# Patient Record
Sex: MALE | Race: WHITE | Employment: FULL TIME | URBAN - METROPOLITAN AREA
[De-identification: names, ages, dates, MRNs, and addresses within clinical notes are randomized per-mention and may not be internally consistent; named-entity substitution may affect disease eponyms.]

---

## 2017-12-15 ENCOUNTER — OFFICE VISIT (OUTPATIENT)
Dept: FAMILY MEDICINE CLINIC | Age: 32
End: 2017-12-15
Payer: COMMERCIAL

## 2017-12-15 VITALS
HEART RATE: 75 BPM | TEMPERATURE: 98.2 F | SYSTOLIC BLOOD PRESSURE: 109 MMHG | DIASTOLIC BLOOD PRESSURE: 67 MMHG | WEIGHT: 120.4 LBS | HEIGHT: 63 IN | BODY MASS INDEX: 21.33 KG/M2

## 2017-12-15 DIAGNOSIS — F17.200 SMOKER UNMOTIVATED TO QUIT: ICD-10-CM

## 2017-12-15 DIAGNOSIS — B07.0 PLANTAR WART OF BOTH FEET: ICD-10-CM

## 2017-12-15 DIAGNOSIS — Z11.4 ENCOUNTER FOR SCREENING FOR HIV: ICD-10-CM

## 2017-12-15 DIAGNOSIS — M54.6 CHRONIC MIDLINE THORACIC BACK PAIN: Primary | ICD-10-CM

## 2017-12-15 DIAGNOSIS — G89.29 CHRONIC MIDLINE THORACIC BACK PAIN: Primary | ICD-10-CM

## 2017-12-15 PROCEDURE — G8420 CALC BMI NORM PARAMETERS: HCPCS | Performed by: STUDENT IN AN ORGANIZED HEALTH CARE EDUCATION/TRAINING PROGRAM

## 2017-12-15 PROCEDURE — G8484 FLU IMMUNIZE NO ADMIN: HCPCS | Performed by: STUDENT IN AN ORGANIZED HEALTH CARE EDUCATION/TRAINING PROGRAM

## 2017-12-15 PROCEDURE — 4004F PT TOBACCO SCREEN RCVD TLK: CPT | Performed by: STUDENT IN AN ORGANIZED HEALTH CARE EDUCATION/TRAINING PROGRAM

## 2017-12-15 PROCEDURE — G8427 DOCREV CUR MEDS BY ELIG CLIN: HCPCS | Performed by: STUDENT IN AN ORGANIZED HEALTH CARE EDUCATION/TRAINING PROGRAM

## 2017-12-15 PROCEDURE — 99203 OFFICE O/P NEW LOW 30 MIN: CPT | Performed by: STUDENT IN AN ORGANIZED HEALTH CARE EDUCATION/TRAINING PROGRAM

## 2017-12-15 RX ORDER — IBUPROFEN 600 MG/1
600 TABLET ORAL EVERY 6 HOURS PRN
Qty: 120 TABLET | Refills: 2 | Status: SHIPPED | OUTPATIENT
Start: 2017-12-15

## 2017-12-15 ASSESSMENT — ENCOUNTER SYMPTOMS
BACK PAIN: 1
ABDOMINAL PAIN: 0
SHORTNESS OF BREATH: 0
CONSTIPATION: 0
CHEST TIGHTNESS: 0

## 2017-12-15 NOTE — PROGRESS NOTES
Subjective:    Kay Mackenzie is a 33 y.o. male with  has a past medical history of ADHD (attention deficit hyperactivity disorder); Anxiety; Chronic back pain; Elbow fracture; Headache(784.0); Kidney stone; and Osteoarthritis. Family History   Problem Relation Age of Onset    Heart Disease Mother    [place] Am[place] COPD Mother     Urolithiasis Mother     COPD Father     Emphysema Father        Presented to the office today for:  Chief Complaint   Patient presents with    New Patient     back pain, lower back     Foot Pain     warts       HPI   Mr. Soledad Adamson is a 34 y/o M patient who comes in today for chronic thoracic back pain. In 2014 patient was trying to climb over a fence and he fell on the fence and hit his back. Pt went to the ER at that time. Imaging was unremarkable. Patient was sent home on Flexeril, Ibuprofen and Norco.   Medications helped with pain at that time. Pain has been consistent since 2014. Patient never reached out to a PCP until today for back pain. Pain is midline thoracic in region. Non radiation. 9/10 during exacerbations. Excerebration every 2 weeks. Hot baths regress the pain. Twisting and turning alleviates the pain. Pain is worse in the morning, gets better throughout the day. Pt denies any bladder or bowel incontinence. No numbness or tingling. Pt also comes in for plantar warts in bilateral foot. Pt cannot recall how long the warts have been there for. He has tried soaking his feet in warm water, and tried exfoliating without any relief. He has also tried OTC salicylic acid and at times tried scraping the wart with a knife. Pt is chronic smoker, 1 to 1.5 ppd for the past 10 years. Drinking occasionally. Review of Systems   Constitutional: Negative for chills, fatigue and fever. Respiratory: Negative for chest tightness and shortness of breath. Cardiovascular: Negative for chest pain and palpitations.    Gastrointestinal: Negative for abdominal pain and HIV Screen; Future    4. Smoker unmotivated to quit  - Discussed importance of cutting down. Pt not ready at this time          Requested Prescriptions     Signed Prescriptions Disp Refills    ibuprofen (ADVIL;MOTRIN) 600 MG tablet 120 tablet 2     Sig: Take 1 tablet by mouth every 6 hours as needed for Pain       Medications Discontinued During This Encounter   Medication Reason    ibuprofen (ADVIL;MOTRIN) 800 MG tablet Alternate therapy    ibuprofen (ADVIL;MOTRIN) 800 MG tablet Alternate therapy       Amadou Garcia received counseling on the following healthy behaviors: nutrition, exercise and medication adherence    Discussed use, benefit, and side effects of prescribed medications. Barriers to medication compliance addressed. All patient questions answered. Pt voiced understanding. Return in about 3 months (around 3/15/2018), or if symptoms worsen or fail to improve, for Back pain .

## 2017-12-15 NOTE — PATIENT INSTRUCTIONS
license by [facility] ([facility]). If you have questions about a medical condition or this instruction, always ask your healthcare professional. [place] [address] any warranty or liability for your use of this information. Patient Education        Healthy Upper Back: Exercises  Your Care Instructions  Here are some examples of exercises for your upper back. Start each exercise slowly. Ease off the exercise if you start to have pain. Your doctor or physical therapist will tell you when you can start these exercises and which ones will work best for you. How to do the exercises  Lower neck and upper back stretch    1. Stretch your arms out in front of your body. Clasp one hand on top of your other hand. 2. Gently reach out so that you feel your shoulder blades stretching away from each other. 3. Gently bend your head forward. 4. Hold for 15 to 30 seconds. 5. Repeat 2 to 4 times. Midback stretch    If you have knee pain, do not do this exercise. 1. Kneel on the floor, and sit back on your ankles. 2. Lean forward, place your hands on the floor, and stretch your arms out in front of you. Rest your head between your arms. 3. Gently push your chest toward the floor, reaching as far in front of you as possible. 4. Hold for 15 to 30 seconds. 5. Repeat 2 to 4 times. Shoulder rolls    1. Sit comfortably with your feet shoulder-width apart. You can also do this exercise while standing. 2. Roll your shoulders up, then back, and then down in a smooth, circular motion. 3. Repeat 2 to 4 times. Wall push-up    1. Stand against a wall with your feet about 12 to 24 inches back from the wall. If you feel any pain when you do this exercise, stand closer to the wall. 2. Place your hands on the wall slightly wider apart than your shoulders, and lean forward. 3. Gently lean your body toward the wall. Then push back to your starting position. Keep the motion smooth and controlled.   4. Repeat 8 to 12 Start each exercise slowly. Ease off the exercise if you start to have pain. Your doctor or physical therapist will tell you when you can start these exercises and which ones will work best for you. How to do the exercises  Lower neck and upper back stretch    6. Stretch your arms out in front of your body. Clasp one hand on top of your other hand. 7. Gently reach out so that you feel your shoulder blades stretching away from each other. 8. Gently bend your head forward. 9. Hold for 15 to 30 seconds. 10. Repeat 2 to 4 times. Midback stretch    If you have knee pain, do not do this exercise. 6. Kneel on the floor, and sit back on your ankles. 7. Lean forward, place your hands on the floor, and stretch your arms out in front of you. Rest your head between your arms. 8. Gently push your chest toward the floor, reaching as far in front of you as possible. 9. Hold for 15 to 30 seconds. 10. Repeat 2 to 4 times. Shoulder rolls    4. Sit comfortably with your feet shoulder-width apart. You can also do this exercise while standing. 5. Roll your shoulders up, then back, and then down in a smooth, circular motion. 6. Repeat 2 to 4 times. Wall push-up    5. Stand against a wall with your feet about 12 to 24 inches back from the wall. If you feel any pain when you do this exercise, stand closer to the wall. 6. Place your hands on the wall slightly wider apart than your shoulders, and lean forward. 7. Gently lean your body toward the wall. Then push back to your starting position. Keep the motion smooth and controlled. 8. Repeat 8 to 12 times. Resisted shoulder blade squeeze    For this exercise, you will need elastic exercise material, such as surgical tubing or Thera-Band.  5. Sit or stand, holding the band in both hands in front of you. Keep your elbows close to your sides, bent at a 90-degree angle. Your palms should face up.   6. Squeeze your shoulder blades together, and move your arms to the outside,

## 2017-12-18 ENCOUNTER — TELEPHONE (OUTPATIENT)
Dept: ADMINISTRATIVE | Age: 32
End: 2017-12-18

## 2017-12-19 ENCOUNTER — HOSPITAL ENCOUNTER (OUTPATIENT)
Dept: PHYSICAL THERAPY | Age: 32
Setting detail: THERAPIES SERIES
Discharge: HOME | End: 2017-12-19
Payer: COMMERCIAL

## 2017-12-19 PROCEDURE — 97140 MANUAL THERAPY 1/> REGIONS: CPT

## 2017-12-19 PROCEDURE — 97161 PT EVAL LOW COMPLEX 20 MIN: CPT

## 2017-12-19 PROCEDURE — 97110 THERAPEUTIC EXERCISES: CPT

## 2017-12-19 NOTE — CONSULTS
[x] No  Action:  Symptoms:  [] Improving [] Worsening [x] Same  Better:  [] AM    [] PM    [] Sit    [] Rise/Sit    [x]Stand    [] Walk    [] Lying    [] Other:  Worse: [] AM    [] PM    [x] Sit    [] Rise/Sit    []Stand    [] Walk    [] Lying    [x] Bend                             [] Valsalva    [] Other:  Sleep: [] OK    [x] Disturbed    Objective:      STRENGTH  STRENGTH  ROM    Left Right  Left Right Cervical    C5 Shld Abd   L1-2 Hip Flex 5 5 Flexion    Shld Flexion   Hip Abd 5 5 Extension    Shld IR   L3-4 Knee Ext 5 5 Rotation L R   Shld ER   L4 Ankle DF 5 5 Sidebend L R   C6 Elb Flex   L5 EHL   Retraction    C7 Elb Ext   S1 Plant. Flex   Lumbar    C8 EPL   Abdominals   Flexion 50% pain   T1 Fing Abd   Erector Spinae   Extension Full pain         Rotation L  R         Sidebend L R         UE/LE WFL                                                                 TESTS (+/-) LEFT RIGHT Not Tested   SLR [] sit [] supine - - []   Hamstring (SLR) Mild tight Mild tight []   SKTC - - []   DKTC - - []   Slump/Dural - - []   SI JT - ? []   CIRO - - []   Joint Mobility Pain A/P mobs   T 11-L1  []   Cerv. Comp   [x]   Cerv. Distraction   [x]   Cerv. Alar/Transverse   [x]   Vertebral Artery   [x]   Adsons   [x]   Baldwin Troy   [x]   Stevie Tests ? Pain ?  Pain No Change Not Tested   RFIS [] [] [] [x]   MILY [] [] [] [x]   RFIL [] [] [] [x]   REIL [] [] [] [x]   Rep Prot [] [] [] [x]   Rep Retract [] [] [] [x]       OBSERVATION No Deficit Deficit Not Tested Comments   Posture       Forward Head [] [x] [] slight   Rounded Shoulders [] [x] []    Kyphosis [x] [] []    Lordosis [x] [] []    Lateral Shift [x] [] []    Scoliosis [x] [] []    Iliac Crest [] [x] [] R higher   PSIS [] [x] [] R higher   ASIS [] [] []    Genu Valgus [x] [] []    Genu Varus [x] [] []    Genu Recurvatum [x] [] []    Pronation [x] [] []    Supination [x] [] []    Leg Length Discrp [] [x] [] Possibly Rt longer   Slumped Sitting [] [x] []    Palpation [] [x] [] Rt thoracic paraspinal trigger points, mod tenderness over spinous processes L1, T12,T11   Sensation [x] [] []    Edema [x] [] []    Neurological [x] [] []                FUNCTION Normal Difficult Unable   Sitting [] [x] []   Standing [x] [] []   Ambulation [x] [] []   Groom/Dress [] [x] []   Lift/Carry [] [x] []   Stairs [x] [] []   Bending [] [x] []   OH reach [] [x] []   Sit to Stand [x] [] []     Comments: LEFS score 60 = 75% fxn    Assessment:  Problems:    [x] ? Back Pain:    [] ? Cervical Pain:    [] ? ROM:     [] ? Strength:   [x] ? Function:  [x] Postural Deviations  [] Gait Deviations  [x] Other:spasm/trigger point    STG: (to be met in 10 treatments)  1. ? Pain: Pain at 1 or less most times and more intermittent  2. ? ROM:  3. ? Strength:  4. ? Function: Improve LEFS by 15% or more,  Tolerate sitting for an hour or more, Able to roll over in bed without pain,  Able to twist at work without sharp pain. 5. Min to no spasm/trigger point    LTG: (to be met in 12 treatments)         1. Independent with Home Exercise Programs      Patient goals: eliminate pain     Rehab Potential:  [x] Good  [] Fair  [] Poor   Suggested Professional Referral:  [] No  [x] Yes:Possibly a Ortho Specialist  Barriers to Goal Achievement[de-identified]  [x] No  [] Yes:  Domestic Concerns:  [x] No  [] Yes:     Pt. Education:  [x] Plans/Goals, Risks/Benefits discussed  [] Home exercise program  Method of Education: [x] Verbal  [x] Demo  [] Written  Comprehension of Education:  [x] Verbalizes understanding. [x] Demonstrates understanding. [] Needs Review. [] Demonstrates/verbalizes understanding of HEP/Ed previously given.     Treatment Plan:  [x] Therapeutic Exercise    [x] Modalities:  [x] Therapeutic Activity    [] Ultrasound  [] Electrical Stimulation  [] Gait Training     []Massage       [x] Lumbar/Cervical Traction  [] Neuromuscular Re-education [] Cold/hotpack [] Iontophoresis: 4 mg/mL  [x] Instruction in HEP

## 2017-12-28 ENCOUNTER — HOSPITAL ENCOUNTER (OUTPATIENT)
Dept: PHYSICAL THERAPY | Age: 32
Setting detail: THERAPIES SERIES
Discharge: HOME | End: 2017-12-28
Payer: COMMERCIAL

## 2017-12-28 PROCEDURE — 97140 MANUAL THERAPY 1/> REGIONS: CPT

## 2017-12-28 PROCEDURE — 97110 THERAPEUTIC EXERCISES: CPT

## 2017-12-28 NOTE — FLOWSHEET NOTE
[x] Melanie Quintana       Outpatient Physical        Therapy       [address]       Phone: [phone]       Fax: [phone] [] [facility] at [address]       Phone: [phone]       Fax: [phone] [] [facility]. [address] [place] [address]  [address] [place] [address]   Phone: [phone]   Fax:  [phone]     Physical Therapy Daily Treatment Note    Date:  2017  Patient Name:  Rasta Easton    :  1985  MRN: [id number]  Physician: Jessica Durán MD - [address] [facility] Floor: [facility]  Medical Diagnosis: Chronic thoracic back pain M54.6, G89.29                    Rehab Codes: pain .6, spasm M62.830  Onset Date:2014                 Next 's appt. : ?  Visit# / total visits:   Cancels/No Shows: 1/0    Subjective:    Pain:  [x] Yes  [] No Location: Low/mid back Pain Rating: (0-10 scale) 5/10  Pain altered Tx:  [x] No  [] Yes  Action:  Comments: Increased pain reported today after working yesterday 7.5 hrs standing. Intermittent sharp pain into R hip    Objective:  Modalities:    Precautions:  Exercises:  Exercise Reps/ Time Weight/ Level Comments   SKTC 5x     DKTC 5x     Piriformis 5x     Hip ER 5x     HS 3x20\"     PPT 10x     Abd iso with marchin 10x     Bridging 10x                 Other:   Manual- Muscle energy shot gut approach to correct R leg longer with no audible pop or pain. Trial 3x R leg resistive leg extension   Specific Instructions for next treatment:    Treatment Charges: Mins Units   []  Modalities     [x]  Ther Exercise 30 2   [x]  Manual Therapy 10 1   []  Ther Activities     []  Aquatics     []  Vasocompression     []  Other     Total Treatment time 40 3       Assessment: [x] Progressing toward goals. Patient presented with R leg slightly longer then the left.   Manual MET's to correct R leg discrepancy after 3 resistive leg extension on the R and 1 shot gun technique. Patient advanced in [facility] to increase core strength with good carry over. Patient noted a decrease in pain at the end of treatment session. [] No change. [] Other:    Problems:    [x] ? Back Pain:                 [] ? Cervical Pain:            [] ? ROM:                         [] ? Strength:        [x] ? Function:  [x] Postural Deviations  [] Gait Deviations  [x] Other:spasm/trigger point                    STG: (to be met in 10 treatments)  1. ? Pain: Pain at 1 or less most times and more intermittent  2. ? ROM:  3. ? Strength:  4. ? Function: Improve LEFS by 15% or more,  Tolerate sitting for an hour or more, Able to roll over in bed without pain,  Able to twist at work without sharp pain. 5. Min to no spasm/trigger point     LTG: (to be met in 12 treatments)         1. Independent with Home Exercise Programs        Patient goals: eliminate pain     Rehab Potential:  [x] Good  [] Fair  [] Poor   Suggested Professional Referral:  [] No  [x] Yes:Possibly a Ortho Specialist  Barriers to Goal Achievement[de-identified]  [x] No  [] Yes:  Domestic Concerns:  [x] No  [] Yes:    Pt. Education:  [] Yes  [] No  [x] Reviewed Prior HEP/Ed  Method of Education: [] Verbal  [] Demo  [] Written  Comprehension of Education:  [] Verbalizes understanding. [] Demonstrates understanding. [x] Needs review. [x] Demonstrates/verbalizes HEP/Ed previously given. Patient stated he is compliant with his HEP; however was unable to recall his exercises      Plan: [x] Continue per plan of care.    [] Other:      Time In: 0337            Time Out: 1100     Electronically signed by:  Denise Diaz PTA

## 2018-01-02 ENCOUNTER — HOSPITAL ENCOUNTER (OUTPATIENT)
Dept: PHYSICAL THERAPY | Age: 33
Setting detail: THERAPIES SERIES
Discharge: HOME | End: 2018-01-02
Payer: COMMERCIAL

## 2018-01-02 NOTE — FLOWSHEET NOTE
[x] [facility]        Outpatient Physical                Therapy       [address]       Phone: [phone]       Fax: [phone] [] [facility] at [address]       Phone: [phone]       Fax: [phone] [] [facility].  [address] [place] [address]      Phone: [phone]      Fax:  [phone]     Physical Therapy Cancel/No Show note    Date: 2018  Patient: Fili Becerra  : 1985  MRN: [id number]    Cancels/No Shows to date:     For today's appointment patient:  [x]  Cancelled  []  Rescheduled appointment  []  No-show     Reason given by patient:  []  Patient ill  []  Conflicting appointment  []  No transportation    []  Conflict with work  [x]  No reason given  []  Weather related  []  Other:      Comments:      [x]  Next appointment was confirmed    Electronically signed by: Matti Butcher PT

## 2018-01-04 ENCOUNTER — HOSPITAL ENCOUNTER (OUTPATIENT)
Dept: PHYSICAL THERAPY | Age: 33
Setting detail: THERAPIES SERIES
Discharge: HOME | End: 2018-01-04
Payer: COMMERCIAL

## 2018-01-08 ENCOUNTER — APPOINTMENT (OUTPATIENT)
Dept: PHYSICAL THERAPY | Age: 33
End: 2018-01-08
Payer: COMMERCIAL

## 2018-01-09 ENCOUNTER — HOSPITAL ENCOUNTER (OUTPATIENT)
Dept: PHYSICAL THERAPY | Age: 33
Setting detail: THERAPIES SERIES
Discharge: HOME | End: 2018-01-09
Payer: COMMERCIAL

## 2018-01-09 NOTE — FLOWSHEET NOTE
[x] Zaira Webb        Outpatient Physical                Therapy       [address]       Phone: [phone]       Fax: [phone] [] [facility] at [address]       Phone: [phone]       Fax: [phone] [] [facility].  [address] [place] [address]      Phone: [phone]      Fax:  [phone]     Physical Therapy Cancel/No Show note    Date: 2018  Patient: Verenice Thomason  : 1985  MRN: [id number]    Cancels/No Shows to date: 3/1    For today's appointment patient:  [x]  Cancelled  []  Rescheduled appointment  []  No-show     Reason given by patient:  []  Patient ill  []  Conflicting appointment  [x]  No transportation - car trouble   []  Conflict with work  []  No reason given  []  Weather related  []  Other:      Comments:       [x]  Next appointment was confirmed per     Electronically signed by: Hayley Storey, PT

## 2018-01-11 ENCOUNTER — HOSPITAL ENCOUNTER (OUTPATIENT)
Dept: PHYSICAL THERAPY | Age: 33
Setting detail: THERAPIES SERIES
Discharge: HOME | End: 2018-01-11
Payer: COMMERCIAL

## 2018-01-16 ENCOUNTER — HOSPITAL ENCOUNTER (OUTPATIENT)
Dept: PHYSICAL THERAPY | Age: 33
Setting detail: THERAPIES SERIES
Discharge: HOME | End: 2018-01-16
Payer: COMMERCIAL

## 2018-01-18 ENCOUNTER — APPOINTMENT (OUTPATIENT)
Dept: PHYSICAL THERAPY | Age: 33
End: 2018-01-18
Payer: COMMERCIAL

## 2018-01-23 ENCOUNTER — HOSPITAL ENCOUNTER (OUTPATIENT)
Dept: PHYSICAL THERAPY | Age: 33
Setting detail: THERAPIES SERIES
Discharge: HOME | End: 2018-01-23
Payer: COMMERCIAL

## 2018-01-23 PROCEDURE — 97110 THERAPEUTIC EXERCISES: CPT

## 2018-01-23 PROCEDURE — 97140 MANUAL THERAPY 1/> REGIONS: CPT

## 2018-01-23 NOTE — FLOWSHEET NOTE
[x] Nereida Brandon       Outpatient Physical        Therapy       [address]       Phone: [phone]       Fax: [phone] [] [facility] at [address] [facility]       Phone: [phone]       Fax: [phone] [] [facility]  [address] [facility]   Phone: [phone]   Fax:  [phone]     Physical Therapy Daily Treatment Note    Date:  2018  Patient Name:  Mary Mock    :  1985  MRN: [id number]  Physician: Katy Muhammad MD - [address] [facility] Floor: [facility]  Medical Diagnosis: Chronic thoracic back pain M54.6, G89.29                    Rehab Codes: pain .6, spasm M62.830  Onset Date:2014                 Next 's appt. : ?  Visit# / total visits: 3/12  Cancels/No Shows: 1/0    Subjective:    Pain:  [x] Yes  [] No Location: Low/mid back Pain Rating: (0-10 scale) 2/10  Pain altered Tx:  [x] No  [] Yes  Action:  Comments: Pain will come and go especially with bending and lifting. Pain with direct pressure over thoracic spine. Objective:  Modalities:    Precautions:  Exercises:  Exercise Reps/ Time Weight/ Level Comments   Nustep - arms and legs  8 min L6 added         Seated      Piriformis 5x  added   Hip ER 5x  added   HS 5x  added   Trunk flex straight 5x  added   Diagonal stretch- bilat 5x ea  Added  Pressure on rt with right side               Supine       SKTC 5x     DKTC 5x     Piriformis 5x     Hip ER 5x     HS 3x20\"     DLS - bracing   Educated and instructed DLS   PPT 00Z     Abd iso with marchin 10x     Bridging 10x     Alt arm 10 x     Alt legs      Opposite arms/legs                              Other:   Manual- SNAGS lower thoracic eases pain . Trial strap around mid back with trunk extension and 45 deg angle pull for self SNAG.   Trial muscle energy rt T12 and ant glide with left rot x 1 no pain         Specific Instructions for next treatment:Trial manual spine mobs or traction, mechanical lumbar Tx, stretching       Treatment Charges: Mins Units   []  Modalities     [x]  Ther Exercise 30 2   [x]  Manual Therapy 20 1   []  Ther Activities     []  Aquatics     []  Vasocompression     []  Other     Total Treatment time 50 3       Assessment: [x] Progressing toward goals. [] No change. [] Other:    Problems:    [x] ? Back Pain:                 [] ? Cervical Pain:            [] ? ROM:                         [] ? Strength:        [x] ? Function:  [x] Postural Deviations  [] Gait Deviations  [x] Other:spasm/trigger point                    STG: (to be met in 10 treatments)  1. ? Pain: Pain at 1 or less most times and more intermittent  2. ? ROM:  3. ? Strength:  4. ? Function: Improve LEFS by 15% or more,  Tolerate sitting for an hour or more, Able to roll over in bed without pain,  Able to twist at work without sharp pain. 5. Min to no spasm/trigger point     LTG: (to be met in 12 treatments)         1. Independent with Home Exercise Programs        Patient goals: eliminate pain     Rehab Potential:  [x] Good  [] Fair  [] Poor   Suggested Professional Referral:  [] No  [x] Yes:Possibly a Ortho Specialist  Barriers to Goal Achievement[de-identified]  [x] No  [] Yes:  Domestic Concerns:  [x] No  [] Yes:    Pt. Education:  [] Yes  [] No  [x] Reviewed Prior HEP/Ed  Method of Education: [] Verbal  [] Demo  [] Written  Comprehension of Education:  [] Verbalizes understanding. [] Demonstrates understanding. [x] Needs review. [x] Demonstrates/verbalizes HEP/Ed previously given. Patient stated he is compliant with his HEP; however was unable to recall his exercises      Plan: [x] Continue per plan of care.    [] Other:      Time In: 6508           Time Out:  [place] 10    Electronically signed by:  Brooke Valle, PT

## 2018-01-30 ENCOUNTER — HOSPITAL ENCOUNTER (OUTPATIENT)
Dept: PHYSICAL THERAPY | Age: 33
Setting detail: THERAPIES SERIES
Discharge: HOME | End: 2018-01-30
Payer: COMMERCIAL

## 2018-01-30 PROCEDURE — 97110 THERAPEUTIC EXERCISES: CPT

## 2018-01-30 PROCEDURE — 97140 MANUAL THERAPY 1/> REGIONS: CPT

## 2018-01-30 NOTE — FLOWSHEET NOTE
Manual- educated on trigger points and deep pressure releases. Trial release to 2 areas rt low thoracic paraspinals an 1 area left side with good release - felt better after    Specific Instructions for next treatment:Trial manual spine mobs or traction, mechanical lumbar Tx, stretching       Treatment Charges: Mins Units   []  Modalities     [x]  Ther Exercise 42 2   [x]  Manual Therapy 10 1   []  Ther Activities     []  Aquatics     []  Vasocompression     []  Other     Total Treatment time 52 3       Assessment: [x] Progressing toward goals. [] No change. [x] Other:better after manual myofascial work  Problems:    [x] ? Back Pain:                 [] ? Cervical Pain:            [] ? ROM:                         [] ? Strength:        [x] ? Function:  [x] Postural Deviations  [] Gait Deviations  [x] Other:spasm/trigger point                    STG: (to be met in 10 treatments)  1. ? Pain: Pain at 1 or less most times and more intermittent  2. ? ROM:  3. ? Strength:  4. ? Function: Improve LEFS by 15% or more,  Tolerate sitting for an hour or more, Able to roll over in bed without pain,  Able to twist at work without sharp pain. 5. Min to no spasm/trigger point     LTG: (to be met in 12 treatments)         1. Independent with Home Exercise Programs        Patient goals: eliminate pain        Pt. Education:  [] Yes  [] No  [x] Reviewed Prior HEP/Ed  Method of Education: [] Verbal  [] Demo  [] Written  Comprehension of Education:  [] Verbalizes understanding. [] Demonstrates understanding. [x] Needs review. [x] Demonstrates/verbalizes HEP/Ed previously given. Patient stated he is compliant with his HEP; however was unable to recall his exercises      Plan: [x] Continue per plan of care.    [] Other:      Time In: 2196           Time Out:  1210    Electronically signed by:  Patrice Sam, PT

## 2018-02-05 ENCOUNTER — HOSPITAL ENCOUNTER (OUTPATIENT)
Dept: PHYSICAL THERAPY | Age: 33
Setting detail: THERAPIES SERIES
Discharge: HOME | End: 2018-02-05
Payer: COMMERCIAL

## 2018-02-05 NOTE — FLOWSHEET NOTE
[x] Nereida Brandon        Outpatient Physical                Therapy       [address]       Phone: [phone]       Fax: [phone] [] [facility]       Promotion at [address] [place] [address]       Phone: [phone]       Fax: [phone] [] [facility]     [address] [place] [address]      Phone: [phone]      Fax:  [phone]     Physical Therapy Cancel/No Show note    Date: 2018  Patient: Mary Mock  : 1985  MRN: [id number]    Cancels/No Shows to date:    For today's appointment patient:  [x]  Cancelled  []  Rescheduled appointment  []  No-show     Reason given by patient:  []  Patient ill  []  Conflicting appointment  []  No transportation -    []  Conflict with work  []  No reason given  []  Weather related  [x]  Other:      Comments:  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   [x]  Next appointment was confirmed     Electronically signed by: Hawa , PT

## 2018-02-06 ENCOUNTER — APPOINTMENT (OUTPATIENT)
Dept: PHYSICAL THERAPY | Age: 33
End: 2018-02-06
Payer: COMMERCIAL

## 2018-02-12 ENCOUNTER — HOSPITAL ENCOUNTER (OUTPATIENT)
Dept: PHYSICAL THERAPY | Age: 33
Setting detail: THERAPIES SERIES
Discharge: HOME | End: 2018-02-12
Payer: COMMERCIAL

## 2018-02-12 ENCOUNTER — OFFICE VISIT (OUTPATIENT)
Dept: PODIATRY | Age: 33
End: 2018-02-12
Payer: COMMERCIAL

## 2018-02-12 VITALS
HEART RATE: 65 BPM | WEIGHT: 121 LBS | SYSTOLIC BLOOD PRESSURE: 120 MMHG | HEIGHT: 63 IN | BODY MASS INDEX: 21.44 KG/M2 | TEMPERATURE: 98.4 F | DIASTOLIC BLOOD PRESSURE: 72 MMHG

## 2018-02-12 DIAGNOSIS — L84 CALLUS OF FOOT: Primary | ICD-10-CM

## 2018-02-12 DIAGNOSIS — F17.200 SMOKING: ICD-10-CM

## 2018-02-12 DIAGNOSIS — M79.671 PAIN IN BOTH FEET: ICD-10-CM

## 2018-02-12 DIAGNOSIS — M79.672 PAIN IN BOTH FEET: ICD-10-CM

## 2018-02-12 PROCEDURE — 99203 OFFICE O/P NEW LOW 30 MIN: CPT | Performed by: PODIATRIST

## 2018-02-12 PROCEDURE — 11056 PARNG/CUTG B9 HYPRKR LES 2-4: CPT | Performed by: PODIATRIST

## 2018-02-12 PROCEDURE — 97110 THERAPEUTIC EXERCISES: CPT

## 2018-02-12 RX ORDER — UREA 40 %
CREAM (GRAM) TOPICAL
Qty: 85 G | Refills: 0 | Status: SHIPPED | OUTPATIENT
Start: 2018-02-12

## 2018-02-12 NOTE — PROGRESS NOTES
medications, social and family history as documented unless otherwise noted below. Documentation of the HPI, Physical Exam and Medical Decision Making performed by medical students or scribes is based on my personal performance of the HPI, PE and MDM. I have personally evaluated this patient and have completed at least one if not all key elements of the E/M (history, physical exam, and MDM). Additional findings are as noted. Destiny Fonseca D.P.M.

## 2018-02-12 NOTE — FLOWSHEET NOTE
[x] [facility]       Outpatient Physical        Therapy       [address]       Phone: [phone]       Fax: [phone] [] [facility] Promotion at [address]       Phone: [phone]       Fax: [phone] [] [facility]. [address] [place] [address] Health Promotion  [address] [place], [place] [address]   Phone: [phone]   Fax:  [phone]     Physical Therapy Daily Treatment Note    Date:  2018  Patient Name:  Cholo Gillespie    :  1985  MRN: [id number]  Physician: Yolanda Bah MD - [address] [facility],[address] Floor: [facility]  Medical Diagnosis: Chronic thoracic back pain M54.6, G89.29                    Rehab Codes: pain .6, spasm M62.830  Onset Date:2014                 Next 's appt. : ?  Visit# / total visits:   Cancels/No Shows: 2/0    Subjective:    Pain:  [x] Yes  [] No Location: Low/mid back Pain Rating: (0-10 scale) 3-4/10  Pain altered Tx:  [x] No  [] Yes  Action:  Comments: Patient states back pain is higher today than it has been, but does not recall a specific event that triggered the increase. Patient reports trigger release during previous session provided good pain relief that day, but states he had severely increased pain in those areas the following day.         Objective:  Modalities:    Precautions:  Exercises:  Exercise Reps/ Time Weight/ Level Comments   Nustep - arms and legs  10 min L6 Increased time 2/12    Total Gym - squats  3 x 10 L35    Seated      Piriformis 5x      Hip ER 5x     HS 5x     Trunk flex straight 5x     Diagonal stretch- bilat 5x ea    Pressure on rt with right side   Arm pull with rotation and sidebend 5x  Added 2/12         Supine       SKTC 5x     DKTC 5x     Piriformis 5x     Hip ER 5x     HS 3x20\"     DLS - bracing   Educated and instructed DLS   neutral spine 98B     Abd iso with marchin 10x     Bridging 10x     Alt arm 10 x     Alt legs  10 x     Opposite arms/legs 10 x performing HS stretches at home. Plan: [x] Continue per plan of care.    [] Other:      Time In: 6680           Time Out:  1050    Electronically signed by:  Jocelyne Oro PTA

## 2018-02-19 ENCOUNTER — HOSPITAL ENCOUNTER (OUTPATIENT)
Dept: PHYSICAL THERAPY | Age: 33
Setting detail: THERAPIES SERIES
Discharge: HOME | End: 2018-02-19
Payer: COMMERCIAL

## 2018-02-19 NOTE — FLOWSHEET NOTE
[x] [facility]        Outpatient Physical                Therapy       [address]       Phone: [phone]       Fax: [phone] [] [facility]       Promotion at [address] [place] [address]       Phone: [phone]       Fax: [phone] [] [facility] Prom[facility]     [address] [place] [address]      Phone: [phone]      Fax:  [phone]     Physical Therapy Cancel/No Show note    Date: 2018  Patient: Isaac Selby  : 1985  MRN: [id number]    Cancels/No Shows to date: 3/0    For today's appointment patient:  [x]  Cancelled  []  Rescheduled appointment  []  No-show     Reason given by patient:  [x]  Patient ill  []  Conflicting appointment  []  No transportation    []  Conflict with work  []  No reason given  []  Weather related  []  Other:      Comments:      [x]  Next appointment was confirmed    Electronically signed by: Roland Garcia PTA
